# Patient Record
Sex: MALE | Race: WHITE | NOT HISPANIC OR LATINO | Employment: OTHER | URBAN - METROPOLITAN AREA
[De-identification: names, ages, dates, MRNs, and addresses within clinical notes are randomized per-mention and may not be internally consistent; named-entity substitution may affect disease eponyms.]

---

## 2019-10-09 PROBLEM — Z12.5 SCREENING FOR PROSTATE CANCER: Status: ACTIVE | Noted: 2019-10-09

## 2019-10-09 PROBLEM — Z00.00 WELL ADULT EXAM: Status: ACTIVE | Noted: 2019-10-09

## 2019-10-09 PROBLEM — Z79.899 ENCOUNTER FOR LONG-TERM (CURRENT) USE OF MEDICATIONS: Status: ACTIVE | Noted: 2019-10-09

## 2019-10-09 PROBLEM — E11.9 DIABETES MELLITUS WITHOUT COMPLICATION: Status: ACTIVE | Noted: 2019-10-09

## 2019-10-09 PROBLEM — I10 MODERATE HYPERTENSION: Status: ACTIVE | Noted: 2019-10-09

## 2020-01-13 PROBLEM — Z00.00 WELL ADULT EXAM: Status: RESOLVED | Noted: 2019-10-09 | Resolved: 2020-01-13

## 2020-01-17 PROBLEM — E87.6 HYPOKALEMIA: Status: ACTIVE | Noted: 2020-01-17

## 2020-10-12 PROBLEM — Z00.00 WELL ADULT EXAM: Status: ACTIVE | Noted: 2020-10-12

## 2020-10-12 PROBLEM — K21.9 GERD WITHOUT ESOPHAGITIS: Status: ACTIVE | Noted: 2020-10-12

## 2020-10-12 PROBLEM — E08.40 DIABETES MELLITUS DUE TO UNDERLYING CONDITION WITH DIABETIC NEUROPATHY, WITHOUT LONG-TERM CURRENT USE OF INSULIN: Status: ACTIVE | Noted: 2020-10-12

## 2020-12-21 ENCOUNTER — PATIENT MESSAGE (OUTPATIENT)
Dept: OTHER | Facility: OTHER | Age: 65
End: 2020-12-21

## 2021-01-11 PROBLEM — Z00.00 WELL ADULT EXAM: Status: RESOLVED | Noted: 2020-10-12 | Resolved: 2021-01-11

## 2021-03-11 PROBLEM — N18.30 STAGE 3 CHRONIC KIDNEY DISEASE: Status: ACTIVE | Noted: 2021-03-11

## 2021-03-11 PROBLEM — E78.1 ABNORMALLY LOW HIGH DENSITY LIPOPROTEIN (HDL) CHOLESTEROL WITH HYPERTRIGLYCERIDEMIA: Status: ACTIVE | Noted: 2021-03-11

## 2021-03-11 PROBLEM — E78.6 ABNORMALLY LOW HIGH DENSITY LIPOPROTEIN (HDL) CHOLESTEROL WITH HYPERTRIGLYCERIDEMIA: Status: ACTIVE | Noted: 2021-03-11

## 2021-03-11 PROBLEM — Z00.00 WELL ADULT EXAM: Status: ACTIVE | Noted: 2021-03-11

## 2021-06-14 PROBLEM — Z00.00 WELL ADULT EXAM: Status: RESOLVED | Noted: 2021-03-11 | Resolved: 2021-06-14

## 2021-08-15 ENCOUNTER — OFFICE VISIT (OUTPATIENT)
Dept: URGENT CARE | Facility: CLINIC | Age: 66
End: 2021-08-15
Payer: MEDICARE

## 2021-08-15 VITALS
TEMPERATURE: 98 F | RESPIRATION RATE: 19 BRPM | DIASTOLIC BLOOD PRESSURE: 81 MMHG | BODY MASS INDEX: 26.2 KG/M2 | OXYGEN SATURATION: 97 % | HEART RATE: 83 BPM | SYSTOLIC BLOOD PRESSURE: 143 MMHG | HEIGHT: 70 IN | WEIGHT: 183 LBS

## 2021-08-15 DIAGNOSIS — U07.1 COVID-19: Primary | ICD-10-CM

## 2021-08-15 DIAGNOSIS — R50.9 FEVER, UNSPECIFIED FEVER CAUSE: ICD-10-CM

## 2021-08-15 DIAGNOSIS — U07.1 COVID-19 VIRUS DETECTED: ICD-10-CM

## 2021-08-15 LAB
CTP QC/QA: YES
SARS-COV-2 RDRP RESP QL NAA+PROBE: POSITIVE

## 2021-08-15 PROCEDURE — U0002 COVID-19 LAB TEST NON-CDC: HCPCS | Mod: QW,CR,S$GLB, | Performed by: PHYSICIAN ASSISTANT

## 2021-08-15 PROCEDURE — 99202 OFFICE O/P NEW SF 15 MIN: CPT | Mod: CR,S$GLB,, | Performed by: PHYSICIAN ASSISTANT

## 2021-08-15 PROCEDURE — 99202 PR OFFICE/OUTPT VISIT, NEW, LEVL II, 15-29 MIN: ICD-10-PCS | Mod: CR,S$GLB,, | Performed by: PHYSICIAN ASSISTANT

## 2021-08-15 PROCEDURE — U0002: ICD-10-PCS | Mod: QW,CR,S$GLB, | Performed by: PHYSICIAN ASSISTANT

## 2021-08-20 ENCOUNTER — NURSE TRIAGE (OUTPATIENT)
Dept: ADMINISTRATIVE | Facility: CLINIC | Age: 66
End: 2021-08-20

## 2022-01-04 ENCOUNTER — IMMUNIZATION (OUTPATIENT)
Dept: FAMILY MEDICINE | Facility: CLINIC | Age: 67
End: 2022-01-04
Payer: MEDICARE

## 2022-01-04 DIAGNOSIS — Z23 NEED FOR VACCINATION: Primary | ICD-10-CM

## 2022-01-04 PROCEDURE — 0034A COVID-19,VECTOR-NR,RS-AD26,PF,0.5 ML DOSE VACCINE (JANSSEN): CPT | Mod: PBBFAC,PO

## 2022-06-09 PROBLEM — G89.29 CHRONIC RIGHT-SIDED LOW BACK PAIN WITH RIGHT-SIDED SCIATICA: Status: ACTIVE | Noted: 2022-06-09

## 2022-06-09 PROBLEM — I10 MODERATE HYPERTENSION: Status: RESOLVED | Noted: 2019-10-09 | Resolved: 2022-06-09

## 2022-06-09 PROBLEM — M54.41 CHRONIC RIGHT-SIDED LOW BACK PAIN WITH RIGHT-SIDED SCIATICA: Status: ACTIVE | Noted: 2022-06-09

## 2022-08-30 PROBLEM — Z00.00 ANNUAL PHYSICAL EXAM: Status: ACTIVE | Noted: 2022-08-30

## 2022-12-05 PROBLEM — Z00.00 ANNUAL PHYSICAL EXAM: Status: RESOLVED | Noted: 2022-08-30 | Resolved: 2022-12-05

## 2023-04-07 ENCOUNTER — OFFICE VISIT (OUTPATIENT)
Dept: URGENT CARE | Facility: CLINIC | Age: 68
End: 2023-04-07
Payer: MEDICARE

## 2023-04-07 VITALS
HEART RATE: 57 BPM | DIASTOLIC BLOOD PRESSURE: 72 MMHG | SYSTOLIC BLOOD PRESSURE: 152 MMHG | TEMPERATURE: 98 F | OXYGEN SATURATION: 99 % | RESPIRATION RATE: 16 BRPM

## 2023-04-07 DIAGNOSIS — R10.9 ABDOMINAL PAIN, UNSPECIFIED ABDOMINAL LOCATION: ICD-10-CM

## 2023-04-07 DIAGNOSIS — T14.8XXA MUSCLE STRAIN: Primary | ICD-10-CM

## 2023-04-07 DIAGNOSIS — M25.552 PAIN OF LEFT HIP: ICD-10-CM

## 2023-04-07 LAB
BILIRUB UR QL STRIP: NEGATIVE
COLOR UR: NORMAL
GLUCOSE UR QL STRIP: NEGATIVE
KETONES UR QL STRIP: NEGATIVE
LEUKOCYTE ESTERASE UR QL STRIP: NEGATIVE
PH, POC UA: NORMAL (ref 5–8)
POC BLOOD, URINE: NEGATIVE
POC NITRATES, URINE: NEGATIVE
PROT UR QL STRIP: NEGATIVE
SP GR UR STRIP: 1 (ref 1–1.03)
UROBILINOGEN UR STRIP-ACNC: NORMAL (ref 0.3–2.2)

## 2023-04-07 PROCEDURE — 99203 OFFICE O/P NEW LOW 30 MIN: CPT | Mod: S$GLB,,, | Performed by: NURSE PRACTITIONER

## 2023-04-07 PROCEDURE — 81003 POCT URINALYSIS, DIPSTICK, AUTOMATED, W/O SCOPE: ICD-10-PCS | Mod: QW,S$GLB,, | Performed by: NURSE PRACTITIONER

## 2023-04-07 PROCEDURE — 73502 XR HIP WITH PELVIS WHEN PERFORMED, 2 OR 3 VIEWS LEFT: ICD-10-PCS | Mod: LT,S$GLB,, | Performed by: RADIOLOGY

## 2023-04-07 PROCEDURE — 81003 URINALYSIS AUTO W/O SCOPE: CPT | Mod: QW,S$GLB,, | Performed by: NURSE PRACTITIONER

## 2023-04-07 PROCEDURE — 99203 PR OFFICE/OUTPT VISIT, NEW, LEVL III, 30-44 MIN: ICD-10-PCS | Mod: S$GLB,,, | Performed by: NURSE PRACTITIONER

## 2023-04-07 PROCEDURE — 73502 X-RAY EXAM HIP UNI 2-3 VIEWS: CPT | Mod: LT,S$GLB,, | Performed by: RADIOLOGY

## 2023-04-07 RX ORDER — DICLOFENAC SODIUM 10 MG/G
2 GEL TOPICAL 2 TIMES DAILY PRN
Qty: 1 EACH | Refills: 0 | Status: SHIPPED | OUTPATIENT
Start: 2023-04-07 | End: 2023-09-04

## 2023-04-07 NOTE — PATIENT INSTRUCTIONS

## 2023-04-07 NOTE — PROGRESS NOTES
Subjective:      Patient ID: Frederick Zambrano is a 68 y.o. male.    Vitals:  temperature is 97.7 °F (36.5 °C). His blood pressure is 152/72 (abnormal) and his pulse is 57 (abnormal). His respiration is 16 and oxygen saturation is 99%.     Chief Complaint: Abdominal Pain    Pt presents with left lower abd/flank pain x last night. Pt states he drank lots of water     No urinary symptoms, no nausea/vomiting, diarrhea.   Eating does not affect.    Provider note begins below:   Patient complains of left hip/side pain that started last night.  Patient denies any trauma however he reports that he was working on his car yesterday and was laying on that side on the ground.  Patient denies any abdominal pain, urinary symptoms, CVA tenderness, nausea/vomiting/diarrhea, or CVA tenderness.  He reports that the pain is worse from sitting to standing.  Occasional tenderness with palpation.  No signs of trauma or ecchymosis noted. Afebrile.    Abdominal Pain  This is a new problem. The current episode started yesterday. The onset quality is sudden. The problem occurs constantly. The problem has been unchanged. The pain is located in the left flank. The pain is at a severity of 8/10. The pain is severe. The quality of the pain is aching. The abdominal pain radiates to the LLQ and left flank. Pertinent negatives include no arthralgias, constipation, diarrhea, dysuria, fever, frequency, nausea or vomiting. Nothing aggravates the pain. He has tried nothing for the symptoms. Patient's medical history includes kidney stones.     Constitution: Negative. Negative for chills, fatigue and fever.   HENT:  Negative for ear pain, ear discharge, facial swelling and sinus pressure.    Neck: Negative for neck pain, neck stiffness and painful lymph nodes.   Cardiovascular: Negative.  Negative for chest pain and sob on exertion.   Eyes: Negative.  Negative for eye itching, eye pain and eye redness.   Respiratory: Negative.  Negative for chest  tightness, cough, shortness of breath, wheezing and asthma.    Gastrointestinal:  Positive for abdominal pain. Negative for nausea, vomiting, constipation and diarrhea.   Endocrine: negative. excessive thirst.   Genitourinary: Negative.  Negative for dysuria, frequency, urgency and flank pain.   Musculoskeletal: Negative.  Positive for pain. Negative for trauma, joint pain and joint swelling.   Skin: Negative.  Negative for rash, wound, lesion and hives.   Allergic/Immunologic: Negative.  Negative for eczema, asthma, hives, itching and sneezing.   Neurological: Negative.  Negative for dizziness, passing out, disorientation and altered mental status.   Hematologic/Lymphatic: Negative.  Negative for swollen lymph nodes.   Psychiatric/Behavioral: Negative.  Negative for altered mental status, disorientation and confusion.     Objective:     Physical Exam   Constitutional: He is oriented to person, place, and time. He appears well-developed. He is cooperative.  Non-toxic appearance. He does not appear ill. No distress.   HENT:   Head: Normocephalic and atraumatic.   Ears:   Right Ear: Hearing, tympanic membrane, external ear and ear canal normal.   Left Ear: Hearing, tympanic membrane, external ear and ear canal normal.   Nose: Nose normal. No mucosal edema or nasal deformity. No epistaxis. Right sinus exhibits no maxillary sinus tenderness and no frontal sinus tenderness. Left sinus exhibits no maxillary sinus tenderness and no frontal sinus tenderness.   Mouth/Throat: Uvula is midline, oropharynx is clear and moist and mucous membranes are normal. No trismus in the jaw. Normal dentition. No uvula swelling.   Eyes: Conjunctivae and lids are normal.   Neck: Trachea normal and phonation normal. Neck supple. No neck rigidity present.   Cardiovascular: Normal rate, regular rhythm, normal heart sounds and normal pulses.   Pulmonary/Chest: Effort normal and breath sounds normal. No stridor. No respiratory distress. He has no  wheezes. He has no rhonchi. He has no rales. He exhibits no tenderness.   Abdominal: Normal appearance and bowel sounds are normal. He exhibits no distension. Soft. flat abdomen There is no abdominal tenderness. There is no guarding, no left CVA tenderness and no right CVA tenderness.   Musculoskeletal: Normal range of motion.         General: Normal range of motion.      Thoracic back: Normal.      Lumbar back: Normal.        Back:    Lymphadenopathy:     He has no cervical adenopathy.   Neurological: no focal deficit. He is alert, oriented to person, place, and time and at baseline. He exhibits normal muscle tone.   Skin: Skin is warm, dry, intact and not diaphoretic.   Psychiatric: His speech is normal and behavior is normal. Mood, judgment and thought content normal.   Nursing note and vitals reviewed.  The following results have been reviewed with the patient:  LABS-  Results for orders placed or performed in visit on 04/07/23   POCT Urinalysis, Dipstick, Automated, W/O Scope   Result Value Ref Range    POC Blood, Urine Negative Negative    POC Bilirubin, Urine Negative Negative    POC Urobilinogen, Urine normal 0.3 - 2.2    POC Ketones, Urine Negative Negative    POC Protein, Urine Negative Negative    POC Nitrates, Urine Negative Negative    POC Glucose, Urine Negative Negative    pH, UA n6.0 5 - 8    POC Specific Gravity, Urine 1.005 1.003 - 1.029    POC Leukocytes, Urine Negative Negative    Color, UA Light Yellow Light Yellow, Yellow        IMAGING-  X-Ray Hip 2 or 3 views Left (with Pelvis when performed)    Result Date: 4/7/2023  EXAMINATION: XR HIP WITH PELVIS WHEN PERFORMED, 2 OR 3 VIEWS LEFT CLINICAL HISTORY: Pain in left hip TECHNIQUE: AP view of the pelvis and frog leg lateral view of the left hip were performed. COMPARISON: None FINDINGS: No fracture or dislocation.  Mild bilateral degenerative acetabular spurring with right-sided os acetabuli.  Mild left hip joint space narrowing.  No suspicious  appearing lytic or blastic lesions.  Degenerative change of the visualized lower lumbar spine.     As above. Electronically signed by: Rodo Michaud Date:    04/07/2023 Time:    09:22      Assessment:     1. Muscle strain    2. Abdominal pain, unspecified abdominal location    3. Pain of left hip        Plan:     FOLLOWUP  Follow up if symptoms worsen or fail to improve, for PLEASE CONTACT PCP OR CONTACT THE EMERGENCY ROOM..     PATIENT INSTRUCTIONS  Patient Instructions   INSTRUCTIONS:  - Rest.  - Drink plenty of fluids.  - Take Tylenol and/or Ibuprofen as directed as needed for fever/pain.  Do not take more than the recommended dose.  - follow up with your PCP within the next 1-2 weeks as needed.  - You must understand that you have received an Urgent Care treatment only and that you may be released before all of your medical problems are known or treated.   - You, the patient, will arrange for follow up care as instructed.   - If your condition worsens or fails to improve we recommend that you receive another evaluation at the ER immediately or contact your PCP to discuss your concerns.   - You can call [phone] or [phone] to help schedule an appointment with the appropriate provider.     -If you smoke cigarettes, it would be beneficial for you to stop.         THANK YOU FOR ALLOWING ME TO PARTICIPATE IN YOUR HEALTHCARE,     Carlo Davalos NP   Muscle strain  -     diclofenac sodium (VOLTAREN) 1 % Gel; Apply 2 g topically 2 (two) times daily as needed (pain).  Dispense: 1 each; Refill: 0    Abdominal pain, unspecified abdominal location  -     POCT Urinalysis, Dipstick, Automated, W/O Scope    Pain of left hip  -     X-Ray Hip 2 or 3 views Left (with Pelvis when performed); Future; Expected date: 04/07/2023

## 2023-08-29 ENCOUNTER — PATIENT MESSAGE (OUTPATIENT)
Dept: ADMINISTRATIVE | Facility: OTHER | Age: 68
End: 2023-08-29
Payer: MEDICARE

## 2023-09-02 ENCOUNTER — PATIENT MESSAGE (OUTPATIENT)
Dept: OTHER | Facility: OTHER | Age: 68
End: 2023-09-02
Payer: MEDICARE

## 2023-09-13 ENCOUNTER — OFFICE VISIT (OUTPATIENT)
Dept: NEUROSURGERY | Facility: CLINIC | Age: 68
End: 2023-09-13
Payer: MEDICARE

## 2023-09-13 VITALS
RESPIRATION RATE: 18 BRPM | BODY MASS INDEX: 25.91 KG/M2 | HEIGHT: 70 IN | WEIGHT: 181 LBS | DIASTOLIC BLOOD PRESSURE: 66 MMHG | HEART RATE: 63 BPM | SYSTOLIC BLOOD PRESSURE: 113 MMHG

## 2023-09-13 DIAGNOSIS — Z98.890 HISTORY OF LUMBAR LAMINECTOMY: ICD-10-CM

## 2023-09-13 DIAGNOSIS — M54.16 LUMBAR RADICULOPATHY: ICD-10-CM

## 2023-09-13 DIAGNOSIS — M54.9 DORSALGIA, UNSPECIFIED: Primary | ICD-10-CM

## 2023-09-13 PROCEDURE — 99999PBSHW PR PBB SHADOW TECHNICAL ONLY FILED TO HB: Mod: PBBFAC,,,

## 2023-09-13 PROCEDURE — 99999PBSHW PR PBB SHADOW TECHNICAL ONLY FILED TO HB: ICD-10-PCS | Mod: PBBFAC,,,

## 2023-09-13 PROCEDURE — 99214 OFFICE O/P EST MOD 30 MIN: CPT | Mod: S$PBB,,, | Performed by: PHYSICIAN ASSISTANT

## 2023-09-13 PROCEDURE — 96372 THER/PROPH/DIAG INJ SC/IM: CPT | Mod: PBBFAC,PN

## 2023-09-13 PROCEDURE — 99214 PR OFFICE/OUTPT VISIT, EST, LEVL IV, 30-39 MIN: ICD-10-PCS | Mod: S$PBB,,, | Performed by: PHYSICIAN ASSISTANT

## 2023-09-13 RX ORDER — METHYLPREDNISOLONE ACETATE 40 MG/ML
40 INJECTION, SUSPENSION INTRA-ARTICULAR; INTRALESIONAL; INTRAMUSCULAR; SOFT TISSUE
Status: COMPLETED | OUTPATIENT
Start: 2023-09-13 | End: 2023-09-13

## 2023-09-13 RX ORDER — KETOROLAC TROMETHAMINE 30 MG/ML
30 INJECTION, SOLUTION INTRAMUSCULAR; INTRAVENOUS
Status: COMPLETED | OUTPATIENT
Start: 2023-09-13 | End: 2023-09-13

## 2023-09-13 RX ADMIN — KETOROLAC TROMETHAMINE 30 MG: 30 INJECTION, SOLUTION INTRAMUSCULAR; INTRAVENOUS at 02:09

## 2023-09-13 RX ADMIN — METHYLPREDNISOLONE ACETATE 40 MG: 40 INJECTION, SUSPENSION INTRA-ARTICULAR; INTRALESIONAL; INTRAMUSCULAR; INTRASYNOVIAL; SOFT TISSUE at 02:09

## 2023-09-13 NOTE — PROGRESS NOTES
[facility] Neurosurgery [facility]  Clinic Consult     Consult Requested By: Darrian Rosado DO  PCP: Darrian Rosado DO    SUBJECTIVE:     Chief Complaint:   Chief Complaint   Patient presents with    Lumbar Spine Pain (L-Spine)     Pain in left leg hurting for 2 mos. Laminectomy helped right leg now the pain is in the left leg mostly when sitting.       History of Present Illness:  Frederick Zambrano is a 68 y.o. male with HTN, DM who presents for evaluation of left leg pain. Patient reports onset 2 months ago. The pain is present in the left posterior thigh. It is worse when seated. He has failed to improve with steroids and pain medication. He does have a history of lumbar laminectomy for treatment of right L5 radiculopathy. He also has a history of cervical fusion. He is currently attending PT. He has not received injections recently, but reports injections previously did not provide relief.     Pertinent and recent history, provider evaluations, imaging and data reviewed in EPIC        Past Medical History:   Diagnosis Date    Spinal stenosis of lumbosacral region 1997     Past Surgical History:   Procedure Laterality Date    BUNIONECTOMY      COLONOSCOPY  09/09/2020    Dr. Gordon    EYE SURGERY  Cataract 2014    hydroplasty      KNEE SURGERY      LAMENECTOMY  08/2022    NECK SURGERY  10/13/2022    VASECTOMY  1986     Family History   Problem Relation Age of Onset    Pulmonary embolism Mother     Arthritis Mother     Cancer Father         Lymphoma    Hyperlipidemia Father     Glaucoma Father     Diabetes Father         Type 2    Heart disease Father         Irregular heart beat    Early death Brother         Mesothelioma     Social History     Tobacco Use    Smoking status: Never    Smokeless tobacco: Never   Substance Use Topics    Alcohol use: Not Currently     Alcohol/week: 1.0 standard drink of alcohol     Types: 1 Glasses of wine per week     Comment: social    Drug use: Never       Review of patient's allergies indicates:   Allergen Reactions    Latex, natural rubber Itching    Oxycodone Itching     Itching       Current Outpatient Medications:     brimonidine-timoloL (COMBIGAN) 0.2-0.5 % Drop, Place 1 drop into both eyes 2 (two) times daily., Disp: , Rfl:     celecoxib (CELEBREX) 200 MG capsule, , Disp: , Rfl:     dulaglutide (TRULICITY) 1.5 mg/0.5 mL pen injector, INJECT 0.5ML (=1.5 MG)     SUBCUTANEOUSLY ONCE WEEKLY, Disp: 12 pen , Rfl: 4    glimepiride (AMARYL) 2 MG tablet, TAKE 1 TABLET BEFORE       BREAKFAST Strength: 2 mg, Disp: 90 tablet, Rfl: 4    hydroCHLOROthiazide (HYDRODIURIL) 25 MG tablet, Take 1 tablet (25 mg total) by mouth once daily., Disp: 90 tablet, Rfl: 4    ketoconazole (NIZORAL) 2 % shampoo, Apply 1 application topically 3 (three) times a week., Disp: , Rfl:     losartan (COZAAR) 100 MG tablet, Take 1 tablet (100 mg total) by mouth once daily., Disp: 90 tablet, Rfl: 4    mecobalamin-levomefolate calc (EB-P1 DR) 0.4 mg- 15 mg CpDR, Take 1 capsule by mouth once daily., Disp: 90 capsule, Rfl: 4    metFORMIN (GLUCOPHAGE) 500 MG tablet, Take 1 tablet (500 mg total) by mouth 2 (two) times daily with meals., Disp: 180 tablet, Rfl: 4    metoprolol succinate (TOPROL-XL) 50 MG 24 hr tablet, Take 1 tablet (50 mg total) by mouth once daily., Disp: 90 tablet, Rfl: 4    mometasone (ELOCON) 0.1 % solution, Apply topically., Disp: , Rfl:     pantoprazole (PROTONIX) 40 MG tablet, Take 1 tablet (40 mg total) by mouth once daily., Disp: 90 tablet, Rfl: 4    potassium chloride SA (KLOR-CON M20) 20 MEQ tablet, Take 1 tablet (20 mEq total) by mouth once daily., Disp: 90 tablet, Rfl: 4    predniSONE (DELTASONE) 20 MG tablet, Tab 1 po BID x 3 days, then tab 1 po q AM x 3 days., Disp: 9 tablet, Rfl: 0    simvastatin (ZOCOR) 40 MG tablet, Take 1 tablet (40 mg total) by mouth every other day., Disp: 90 tablet, Rfl: 4    Review of Systems:   Constitutional: no fever, chills or night sweats. No  "changes in weight   Eyes: no visual changes   ENT: no nasal congestion or sore throat   Respiratory: no cough or shortness of breath   Cardiovascular: no chest pain or palpitations   Gastrointestinal: no nausea or vomiting   Genitourinary: no hematuria or dysuria   Integument/Breast: no rash or pruritis   Hematologic/Lymphatic: no easy bruising or lymphadenopathy   Musculoskeletal: +leg pain   Neurological: no seizures or tremors   Behavioral/Psych: no auditory or visual hallucinations   Endocrine: no heat or cold intolerance         OBJECTIVE:     Vital Signs (Most Recent):  Pulse: 63 (09/13/23 1326)  Resp: 18 (09/13/23 1326)  BP: 113/66 (09/13/23 1326)  Estimated body mass index is 25.97 kg/m² as calculated from the following:    Height as of this encounter: 5' 10" (1.778 m).    Weight as of this encounter: 82.1 kg (181 lb).    Physical Exam:   General: well developed, well nourished, no distress   Neurologic: Alert and oriented. Thought content appropriate. GCS 15.   Head: normocephalic, atraumatic  Eyes: EOMI  Neck: trachea midline, no JVD   Cardiovascular: no LE edema  Pulmonary: normal respirations, no signs of respiratory distress  Abdomen: non-distended  Sensory: intact to light touch throughout  Skin: Skin is warm, dry and intact    Motor Strength: Moves all extremities spontaneously with good tone. No abnormal movements seen.       Deltoids Triceps Biceps Wrist Extension Wrist Flexion Hand    Upper: R 5/5 5/5 5/5 5/5 5/5 5/5    L 5/5 5/5 5/5 5/5 5/5 5/5     Iliopsoas Quadriceps Knee  Flexion Tibialis  anterior Gastro- cnemius EHL   Lower: R 5/5 5/5 5/5 5/5 5/5 5/5    L 5/5 5/5 5/5 5/5 5/5 5/5     DTR's: 2+ patellar   Gait: normal          Diagnostic Results:  I have independently reviewed the following imaging:  XR lumbar spine with degenerative changes, mild scoliosis     ASSESSMENT/PLAN:     Dorsalgia, unspecified  -     MRI Lumbar Spine W WO Contrast; Future; Expected date: 09/13/2023    Lumbar " radiculopathy  -     Ambulatory referral/consult to Neurosurgery    History of lumbar laminectomy    Other orders  -     ketorolac injection 30 mg  -     methylPREDNISolone acetate injection 40 mg        Frederick Zambrano is a 68 y.o. male with h/o lumbar laminectomy with new left leg pain in the S1 distribution. X-rays reveal degenerative changes. He failed to improve with oral steroids and is currently attending PT. His wife does not want him to take gabapentin due to potential side effects. I have ordered MRI lumbar spine to evaluate for neural compression. I will review once complete and provide further recommendations.     Patient verbalized understanding of plan. Encouraged to call with any questions or concerns.     This note was partially dictated using voice recognition software, so please excuse any errors that were not corrected.

## 2023-09-26 ENCOUNTER — HOSPITAL ENCOUNTER (OUTPATIENT)
Dept: RADIOLOGY | Facility: HOSPITAL | Age: 68
Discharge: HOME | End: 2023-09-26
Attending: PHYSICIAN ASSISTANT
Payer: MEDICARE

## 2023-09-26 DIAGNOSIS — M54.9 DORSALGIA, UNSPECIFIED: ICD-10-CM

## 2023-09-26 PROCEDURE — 25500020 PHARM REV CODE 255: Mod: PO | Performed by: PHYSICIAN ASSISTANT

## 2023-09-26 PROCEDURE — 72158 MRI LUMBAR SPINE W WO CONTRAST: ICD-10-PCS | Mod: 26,,, | Performed by: RADIOLOGY

## 2023-09-26 PROCEDURE — A9585 GADOBUTROL INJECTION: HCPCS | Mod: PO | Performed by: PHYSICIAN ASSISTANT

## 2023-09-26 PROCEDURE — 72158 MRI LUMBAR SPINE W/O & W/DYE: CPT | Mod: TC,PO

## 2023-09-26 PROCEDURE — 72158 MRI LUMBAR SPINE W/O & W/DYE: CPT | Mod: 26,,, | Performed by: RADIOLOGY

## 2023-09-26 RX ORDER — GADOBUTROL 604.72 MG/ML
8 INJECTION INTRAVENOUS
Status: COMPLETED | OUTPATIENT
Start: 2023-09-26 | End: 2023-09-26

## 2023-09-26 RX ADMIN — GADOBUTROL 8 ML: 604.72 INJECTION INTRAVENOUS at 02:09

## 2023-10-02 ENCOUNTER — TELEPHONE (OUTPATIENT)
Dept: PAIN MEDICINE | Facility: CLINIC | Age: 68
End: 2023-10-02
Payer: MEDICARE

## 2023-10-02 ENCOUNTER — PATIENT MESSAGE (OUTPATIENT)
Dept: NEUROSURGERY | Facility: CLINIC | Age: 68
End: 2023-10-02
Payer: MEDICARE

## 2023-10-02 DIAGNOSIS — M54.16 LUMBAR RADICULOPATHY: Primary | ICD-10-CM

## 2023-10-02 NOTE — TELEPHONE ENCOUNTER
Physician - Dr Carvajal    Type of Procedure/Injection - Lumbar Transforaminal Epidural  L5/S1           Laterality - Left      Type of Sedation - Local      Need to hold medication - No      N/A    Dulaglutide (Trulicity)7 days      Clearance needed - No      Follow up - 2 week

## 2023-10-03 ENCOUNTER — PATIENT MESSAGE (OUTPATIENT)
Dept: PAIN MEDICINE | Facility: CLINIC | Age: 68
End: 2023-10-03
Payer: MEDICARE

## 2023-10-03 DIAGNOSIS — M47.816 LUMBAR SPONDYLOSIS: Primary | ICD-10-CM

## 2023-10-03 DIAGNOSIS — M54.16 LUMBAR RADICULOPATHY: ICD-10-CM

## 2023-10-03 RX ORDER — ALPRAZOLAM 0.5 MG/1
1 TABLET, ORALLY DISINTEGRATING ORAL ONCE AS NEEDED
Status: CANCELLED | OUTPATIENT
Start: 2023-10-03 | End: 2035-03-01

## 2023-10-03 NOTE — TELEPHONE ENCOUNTER
Spoke with patient. Lumbar HAFSA scheduled. Advised patient to hold Trulicity x 7 days prior. [employer] message also sent to patient with pre op information and 2 week follow up appointment.

## 2023-10-17 ENCOUNTER — TELEPHONE (OUTPATIENT)
Dept: NEUROSURGERY | Facility: CLINIC | Age: 68
End: 2023-10-17
Payer: MEDICARE

## 2023-10-17 NOTE — TELEPHONE ENCOUNTER
----- Message from Jaxson Aggarwal sent at 10/17/2023  8:08 AM CDT -----  Regarding: advice  Contact: ADAM DELACRUZ [[id number]]  Type: Needs Medical Advice  Who Called:  Adam    Symptoms (please be specific):  na    How long has patient had these symptoms:  candido    Pharmacy name and phone #:  na    Best Call Back Number: [phone] (home)     Additional Information: Wants to cancel pain mgmt and go ahead with SX. Please call to advise.

## 2023-10-18 ENCOUNTER — TELEPHONE (OUTPATIENT)
Dept: NEUROSURGERY | Facility: CLINIC | Age: 68
End: 2023-10-18
Payer: MEDICARE

## 2023-12-04 PROBLEM — Z00.00 ANNUAL PHYSICAL EXAM: Status: RESOLVED | Noted: 2022-08-30 | Resolved: 2023-12-04

## 2023-12-07 ENCOUNTER — OFFICE VISIT (OUTPATIENT)
Dept: NEUROSURGERY | Facility: CLINIC | Age: 68
End: 2023-12-07
Payer: MEDICARE

## 2023-12-07 VITALS
DIASTOLIC BLOOD PRESSURE: 77 MMHG | HEART RATE: 69 BPM | BODY MASS INDEX: 25.75 KG/M2 | RESPIRATION RATE: 18 BRPM | WEIGHT: 179.88 LBS | SYSTOLIC BLOOD PRESSURE: 122 MMHG | HEIGHT: 70 IN

## 2023-12-07 DIAGNOSIS — Z98.890 HISTORY OF LUMBAR LAMINECTOMY: ICD-10-CM

## 2023-12-07 DIAGNOSIS — M54.16 LUMBAR RADICULOPATHY: Primary | ICD-10-CM

## 2023-12-07 PROCEDURE — 99214 PR OFFICE/OUTPT VISIT, EST, LEVL IV, 30-39 MIN: ICD-10-PCS | Mod: S$PBB,,, | Performed by: STUDENT IN AN ORGANIZED HEALTH CARE EDUCATION/TRAINING PROGRAM

## 2023-12-07 PROCEDURE — 99214 OFFICE O/P EST MOD 30 MIN: CPT | Mod: S$PBB,,, | Performed by: STUDENT IN AN ORGANIZED HEALTH CARE EDUCATION/TRAINING PROGRAM

## 2023-12-07 NOTE — PROGRESS NOTES
[facility]  Clinic Progress Note       PCP: Darrian Rosado DO    SUBJECTIVE:     Chief Complaint:   Chief Complaint   Patient presents with    Follow-up     Patient present to clinic for a f/u to discuss surgery, lumbar. Reports having gone to a chiropractor and doing traction.  No longer having BL leg pain.  Still experiencing LBP.     Interval History 12/7/23  Patient returns for follow up. He did not complete the L5-S1 HAFSA as recommended and wanted to discuss surgical intervention.       History of Present Illness 9/13/23  Frederick Zambrano is a 67yo male with HTN, DM who presents for evaluation of left leg pain. Patient reports onset 2 months ago. The pain is present in the left posterior thigh. It is worse when seated. He has failed to improve with steroids and pain medication. He does have a history of lumbar laminectomy for treatment of right L5 radiculopathy. He also has a history of cervical fusion. He is currently attending PT. He has not received injections recently, but reports injections previously did not provide relief.     Pertinent and recent history, provider evaluations, imaging and data reviewed in EPIC        Past Medical History:   Diagnosis Date    Diabetes mellitus     Hypertension     Spinal stenosis of lumbosacral region 1997     Past Surgical History:   Procedure Laterality Date    BUNIONECTOMY      COLONOSCOPY  09/09/2020    Dr. Gordon    EYE SURGERY  Cataract 2014    hydroplasty      KNEE SURGERY      LAMENECTOMY  08/2022    NECK SURGERY  10/13/2022    VASECTOMY  1986     Family History   Problem Relation Age of Onset    Pulmonary embolism Mother     Arthritis Mother     Cancer Father         Lymphoma    Hyperlipidemia Father     Glaucoma Father     Diabetes Father         Type 2    Heart disease Father         Irregular heart beat    Early death Brother         Mesothelioma     Social History     Tobacco Use    Smoking status: Never     Smokeless tobacco: Never   Substance Use Topics    Alcohol use: Not Currently     Alcohol/week: 1.0 standard drink of alcohol     Types: 1 Glasses of wine per week     Comment: social    Drug use: Never      Review of patient's allergies indicates:   Allergen Reactions    Latex, natural rubber Itching    Oxycodone Itching     Itching       Current Outpatient Medications:     brimonidine-timoloL (COMBIGAN) 0.2-0.5 % Drop, Place 1 drop into both eyes 2 (two) times daily., Disp: , Rfl:     celecoxib (CELEBREX) 200 MG capsule, , Disp: , Rfl:     dulaglutide (TRULICITY) 1.5 mg/0.5 mL pen injector, INJECT 0.5ML (=1.5 MG)     SUBCUTANEOUSLY ONCE WEEKLY, Disp: 12 pen , Rfl: 4    glimepiride (AMARYL) 2 MG tablet, TAKE 1 TABLET BEFORE       BREAKFAST Strength: 2 mg, Disp: 90 tablet, Rfl: 4    hydroCHLOROthiazide (HYDRODIURIL) 25 MG tablet, Take 1 tablet (25 mg total) by mouth once daily., Disp: 90 tablet, Rfl: 4    ketoconazole (NIZORAL) 2 % shampoo, Apply 1 application topically 3 (three) times a week., Disp: , Rfl:     losartan (COZAAR) 100 MG tablet, Take 1 tablet (100 mg total) by mouth once daily., Disp: 90 tablet, Rfl: 4    mecobalamin-levomefolate calc (EB-P1 DR) 0.4 mg- 15 mg CpDR, Take 1 capsule by mouth once daily., Disp: 90 capsule, Rfl: 4    metFORMIN (GLUCOPHAGE) 500 MG tablet, TAKE 1 TABLET TWICE DAILY  WITH MEALS, Disp: 180 tablet, Rfl: 3    metoprolol succinate (TOPROL-XL) 50 MG 24 hr tablet, Take 1 tablet (50 mg total) by mouth once daily., Disp: 90 tablet, Rfl: 3    mometasone (ELOCON) 0.1 % solution, Apply topically., Disp: , Rfl:     pantoprazole (PROTONIX) 40 MG tablet, Take 1 tablet (40 mg total) by mouth once daily., Disp: 90 tablet, Rfl: 4    potassium chloride SA (KLOR-CON M20) 20 MEQ tablet, Take 1 tablet (20 mEq total) by mouth once daily., Disp: 90 tablet, Rfl: 4    predniSONE (DELTASONE) 20 MG tablet, Tab 1 po BID x 3 days, then tab 1 po q AM x 3 days., Disp: 9 tablet, Rfl: 0    simvastatin  "(ZOCOR) 40 MG tablet, Take 1 tablet (40 mg total) by mouth every other day., Disp: 90 tablet, Rfl: 4    Review of Systems:   Constitutional: no fever, chills or night sweats. No changes in weight   Eyes: no visual changes   ENT: no nasal congestion or sore throat   Respiratory: no cough or shortness of breath   Cardiovascular: no chest pain or palpitations   Gastrointestinal: no nausea or vomiting   Genitourinary: no hematuria or dysuria   Integument/Breast: no rash or pruritis   Hematologic/Lymphatic: no easy bruising or lymphadenopathy   Musculoskeletal: +leg pain   Neurological: no seizures or tremors   Behavioral/Psych: no auditory or visual hallucinations   Endocrine: no heat or cold intolerance         OBJECTIVE:     Vital Signs (Most Recent):  Pulse: 69 (12/07/23 1403)  Resp: 18 (12/07/23 1403)  BP: 122/77 (12/07/23 1403)  Estimated body mass index is 25.81 kg/m² as calculated from the following:    Height as of this encounter: 5' 10" (1.778 m).    Weight as of this encounter: 81.6 kg (179 lb 14.3 oz).    Physical Exam:   General: well developed, well nourished, no distress   Neurologic: Alert and oriented. Thought content appropriate. GCS 15.   Head: normocephalic, atraumatic  Eyes: EOMI  Neck: trachea midline, no JVD   Cardiovascular: no LE edema  Pulmonary: normal respirations, no signs of respiratory distress  Abdomen: non-distended  Sensory: intact to light touch throughout  Skin: Skin is warm, dry and intact    Motor Strength: Moves all extremities spontaneously with good tone. No abnormal movements seen.       Deltoids Triceps Biceps Wrist Extension Wrist Flexion Hand    Upper: R 5/5 5/5 5/5 5/5 5/5 5/5    L 5/5 5/5 5/5 5/5 5/5 5/5     Iliopsoas Quadriceps Knee  Flexion Tibialis  anterior Gastro- cnemius EHL   Lower: R 5/5 5/5 5/5 5/5 5/5 5/5    L 5/5 5/5 5/5 5/5 5/5 5/5     DTR's: 2+ patellar   Gait: normal          Diagnostic Results:  I have independently reviewed the following imaging:  MRI " lumbar spine  Alignment: Mild rotatory dextroconvex curvature of the lumbar spine.  Lordosis is maintained.  Minimal stepwise retrolisthesis of L2 on L3, L3 on L4, L4 on L5, and L5 on S1.     Vertebral column: No evidence of an acute fracture or aggressive marrow replacement process. Multilevel disc degeneration with intervertebral disc space narrowing, disc desiccation, and marginal osteophyte formation, most pronounced at L3-4.  Postsurgical changes of right laminectomy at L4-5 with mild expected enhancing postoperative scar tissue along the laminectomy bed and posterior epidural space.     Cord: Normal.  Conus terminates at T12.     Degenerative findings:     T12-L1: Disc is normal configuration.  No neural foraminal or spinal canal stenosis.     L1-L2: Disc is normal in configuration.   No neural foraminal or spinal canal stenosis.     L2-L3: Mild diffuse disc bulge.  Mild bilateral facet arthropathy and ligamentum flavum thickening.  Mild bilateral neural foraminal stenosis.  Mild spinal canal stenosis.     L3-L4: Moderate diffuse disc bulge with osteophytic ridging.  Superimposed small left subarticular disc extrusion through an annular fissure, which mildly ascends to the L3 infrapedicular level.  Moderate left and mild right neural foraminal stenosis.  Moderate spinal canal stenosis.  Mild left greater than right lateral recess stenosis.     L4-L5: Moderate right asymmetric diffuse disc bulge with osteophytic ridging.  Moderate bilateral facet arthropathy.  Ligamentum flavum thickening.  Right laminectomy.  Moderate right and mild left neural foraminal stenosis.  Mild spinal canal stenosis.  Moderate right and mild left lateral recess stenosis.     L5-S1: Right asymmetric diffuse disc bulge with osteophytic ridging and superimposed left subarticular disc protrusion through an annular fissure, which narrows the left lateral recess and posteriorly displaces the left S1 nerve root.  Mild thin pleural  enhancement of the left S1 nerve root, nonspecific but probably reactive secondary to associated impingement.  Moderate bilateral facet arthropathy.  Ligamentum flavum thickening.  Moderate right and mild-to-moderate left neural foraminal stenosis.  No significant spinal canal stenosis.     Paraspinal muscles & soft tissues: No significant abnormality.     No other discrete enhancing intraspinal lesion.    ASSESSMENT/PLAN:     Lumbar radiculopathy  -     Ambulatory referral/consult to Pain Clinic; Future; Expected date: 12/14/2023    History of lumbar laminectomy          Frederick Zambrano is a 68 y.o. male with h/o lumbar laminectomy with new left leg pain in the S1 distribution. X-rays reveal degenerative changes. He failed to improve with oral steroids and is currently attending PT. His wife does not want him to take gabapentin due to potential side effects. I have ordered MRI lumbar spine to evaluate for neural compression. I will review once complete and provide further recommendations.     Assessment and rec today  Has a history with Dr. Mack of an L4-5 lumbar decompression, as well as cervical fusions  Preoperatively he would right leg pain down to the shin.  Had some initial improvement and recurrence of the symptoms.  In addition he is had flare-up of left proximal leg pain  He is neurologically intact.  He is back and leg pain.  His back pain is greatest component currently.  He worked with a chiropractor and improved his leg pain.  I reviewed his imaging in detail.  He has lumbar spondylosis from L3-S1, post central decompression at L4-5  He is facet arthropathy with increased T2 signal at L3-4, less so at L4-5  I he is no focal severe stenosis, no disc herniation, or focal target correlating with the symptoms, likely symptomatic multifactorial from post-laminectomy facet arthropathy and myofascial syndrome.    Primarily back pain, no deformity or listhesis, recommend nonoperative therapy  He he will  be referred to pain management.  He may have targets for medial branch block or rhizotomy at L3-4 and L4-5, which we will discuss with a chief complaint of back pain as a possible  Pain generator I have also r recommended muscle recommended physical therapy                    Patient verbalized understanding of plan. Encouraged to call with any questions or concerns.     This note was partially dictated using voice recognition software, so please excuse any errors that were not corrected.

## 2023-12-12 ENCOUNTER — TELEPHONE (OUTPATIENT)
Dept: NEUROSURGERY | Facility: CLINIC | Age: 68
End: 2023-12-12
Payer: MEDICARE

## 2023-12-12 NOTE — TELEPHONE ENCOUNTER
Pt is calling requesting meds for rhizotomy procedure due to tremors in legs. I explained that we do not manage medications in this department. I instructed pt to contact his PCP or Dr Padron who is doing the procedure to inquire about muscle relaxers/pain meds. Pt verbalized understanding and had no further concerns.   MM- LPN

## 2023-12-13 ENCOUNTER — TELEPHONE (OUTPATIENT)
Dept: PAIN MEDICINE | Facility: CLINIC | Age: 68
End: 2023-12-13
Payer: MEDICARE

## 2023-12-13 NOTE — TELEPHONE ENCOUNTER
----- Message from Cheri Lezama sent at 12/12/2023 10:43 AM CST -----  Regarding: Rx  Contact: Thaddeus [phone]  Type:  RX Refill Request    Who Called:  Thaddeus    Refill or New Rx:  New    RX Name and Strength:  Possible Flexerill / needs Muscle Relaxer     How is the patient currently taking it? (ex. 1XDay):  n/a    Is this a 30 day or 90 day RX:  n/a    Preferred Pharmacy with phone number:    [id number] - [place], [place] - [address]  [address]  [facility] [address]  Phone: [phone] Fax: [phone]    Local or Mail Order:  local    Ordering Provider:  Nereida Burrell Call Back Number:  [phone]    Additional Information:  muscle spasms in lower back, needs muscle relaxer

## 2023-12-13 NOTE — TELEPHONE ENCOUNTER
We have not seen this patient.  He would need to request this from a provider he has seen such as his primary or Neurosurgery

## 2024-02-25 ENCOUNTER — PATIENT MESSAGE (OUTPATIENT)
Dept: ADMINISTRATIVE | Facility: OTHER | Age: 69
End: 2024-02-25
Payer: MEDICARE

## 2024-03-03 ENCOUNTER — PATIENT MESSAGE (OUTPATIENT)
Dept: ADMINISTRATIVE | Facility: OTHER | Age: 69
End: 2024-03-03
Payer: MEDICARE